# Patient Record
Sex: FEMALE | Race: WHITE | NOT HISPANIC OR LATINO | Employment: UNEMPLOYED | URBAN - METROPOLITAN AREA
[De-identification: names, ages, dates, MRNs, and addresses within clinical notes are randomized per-mention and may not be internally consistent; named-entity substitution may affect disease eponyms.]

---

## 2022-06-22 PROBLEM — F15.222 AMPHETAMINE INTOXICATION WITH PERCEPTUAL DISTURBANCE AND MODERATE TO SEVERE USE DISORDER: Status: ACTIVE | Noted: 2022-06-22

## 2022-10-24 ENCOUNTER — OFFICE VISIT (OUTPATIENT)
Dept: FAMILY MEDICINE | Facility: CLINIC | Age: 37
End: 2022-10-24

## 2022-10-24 DIAGNOSIS — F41.8 MIXED ANXIETY AND DEPRESSIVE DISORDER: Primary | ICD-10-CM

## 2022-10-24 DIAGNOSIS — Z76.0 MEDICATION REFILL: ICD-10-CM

## 2022-10-24 PROCEDURE — 99213 OFFICE O/P EST LOW 20 MIN: CPT | Mod: 95,,, | Performed by: REGISTERED NURSE

## 2022-10-24 PROCEDURE — 99213 PR OFFICE/OUTPT VISIT, EST, LEVL III, 20-29 MIN: ICD-10-PCS | Mod: 95,,, | Performed by: REGISTERED NURSE

## 2022-10-24 RX ORDER — VORTIOXETINE 10 MG/1
10 TABLET, FILM COATED ORAL DAILY
Qty: 30 TABLET | Refills: 0 | Status: SHIPPED | OUTPATIENT
Start: 2022-10-24 | End: 2022-12-09 | Stop reason: SDUPTHER

## 2022-10-24 NOTE — PROGRESS NOTES
"PRIMARY CARE TELEMEDICINE NOTE    Patient location:  [place]  Visit type: Virtual visit with synchronous audio and video  Each patient to whom he or she provides medical services by telemedicine is:  (1) informed of the relationship between the physician and patient and the respective role of any other health care provider with respect to management of the patient  (2) notified that he or she may decline to receive medical services by telemedicine and may withdraw from such care at any time      CHIEF COMPLAINT ----   MEDICATION REFILL    Josefina is seen today virtually for a medication refill on Trintellix.  Has taken previously ~ 2015 due to external and personal stressors at that time.  Did okay for a period of time until recently when her father passed away.  She feels she needs to restart the medication for control of emotions/mood.  Report very emotional, "I can't even talk with someone w/out crying".  She has tried Wellbutrin and Zoloft but were not effective.  Lexapro was ordered previously for her in case insurance did not cover Trintellix.  Never took the Lexapro as she did not have an issue getting Trintellix filled.  She has found that Trintellix has worked the best for her "with the least amount of side effects".  Hydroxyzine was previously ordered but never took.  Does have an appt with her PCP next week.      Review of Systems   Constitutional:  Positive for activity change. Negative for appetite change, fatigue and unexpected weight change.   HENT:  Negative for hearing loss, rhinorrhea and trouble swallowing.    Eyes:  Negative for discharge and visual disturbance.   Respiratory:  Negative for chest tightness and wheezing.    Cardiovascular:  Negative for chest pain and palpitations.   Gastrointestinal:  Negative for blood in stool, constipation, diarrhea and vomiting.   Endocrine: Negative for polydipsia and polyuria.   Genitourinary:  Negative for difficulty urinating, dysuria, hematuria and menstrual " problem.   Musculoskeletal:  Negative for arthralgias, joint swelling and neck pain.   Neurological:  Positive for headaches. Negative for tremors, syncope, speech difficulty, weakness, light-headedness and numbness.   Psychiatric/Behavioral:  Positive for agitation, decreased concentration, dysphoric mood and sleep disturbance. Negative for confusion, hallucinations, self-injury and suicidal ideas. The patient is nervous/anxious. The patient is not hyperactive.         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      OBJECTIVE:  Constitutional:  In NAD, pleasant, cooperative.  Appears well-developed and well-nourished.   Respiratory:  Unlabored, in no resp distress.  Neurological: Alert and oriented to person, place, and time.   Psychiatric:  Depressed mood, tearful.  Judgment and thought content normal.       Complete PE deferred due to video visit      DIAGNOSIS/ASSESSMENT:    1. Mixed anxiety and depressive disorder  - vortioxetine (TRINTELLIX) 10 mg Tab; Take 1 tablet (10 mg total) by mouth Daily.  Dispense: 30 tablet; Refill: 0    2. Medication refill  - vortioxetine (TRINTELLIX) 10 mg Tab; Take 1 tablet (10 mg total) by mouth Daily.  Dispense: 30 tablet; Refill: 0      PLAN:    Trintellix refilled per pt request.  No red flags per .    FOLLOW-UP:    See PCP next week as planned.  Any further medication refills to be handled by PCP (located near [place] where she  resides).          20 minutes of total time spent on the encounter, which includes face to face time and non-face to face time preparing to see the patient (eg, review of tests), obtaining and/or reviewing separately obtained history, and documenting clinical information in the electronic or other health record.  Also includes independent interpretation of results (not separately reported) and communicating results to the patient/family/caregiver, with care coordination (not separately reported).

## 2022-10-25 PROBLEM — F41.8 MIXED ANXIETY AND DEPRESSIVE DISORDER: Status: ACTIVE | Noted: 2022-10-25

## 2022-12-08 ENCOUNTER — PATIENT MESSAGE (OUTPATIENT)
Dept: FAMILY MEDICINE | Facility: CLINIC | Age: 37
End: 2022-12-08

## 2022-12-09 DIAGNOSIS — Z76.0 MEDICATION REFILL: ICD-10-CM

## 2022-12-09 DIAGNOSIS — F41.8 MIXED ANXIETY AND DEPRESSIVE DISORDER: ICD-10-CM

## 2022-12-09 RX ORDER — VORTIOXETINE 10 MG/1
10 TABLET, FILM COATED ORAL DAILY
Qty: 30 TABLET | Refills: 0 | Status: SHIPPED | OUTPATIENT
Start: 2022-12-09 | End: 2023-03-24 | Stop reason: SDUPTHER

## 2023-01-11 ENCOUNTER — PATIENT MESSAGE (OUTPATIENT)
Dept: FAMILY MEDICINE | Facility: CLINIC | Age: 38
End: 2023-01-11

## 2023-03-24 PROBLEM — Z76.0 MEDICATION REFILL: Status: ACTIVE | Noted: 2023-03-24

## 2023-03-24 PROBLEM — N83.202 BILATERAL OVARIAN CYSTS: Status: ACTIVE | Noted: 2023-03-24

## 2023-03-24 PROBLEM — N83.201 BILATERAL OVARIAN CYSTS: Status: ACTIVE | Noted: 2023-03-24

## 2023-03-24 PROBLEM — N80.03 ADENOMYOSIS: Status: ACTIVE | Noted: 2023-03-24

## 2023-03-24 PROBLEM — R10.2 PELVIC PAIN: Status: ACTIVE | Noted: 2023-03-24

## 2023-03-24 PROBLEM — N94.6 SEVERE DYSMENORRHEA: Status: ACTIVE | Noted: 2023-03-24

## 2023-04-26 PROBLEM — Z48.02 VISIT FOR SUTURE REMOVAL: Status: ACTIVE | Noted: 2023-04-26

## 2023-05-22 PROBLEM — Z48.02 VISIT FOR SUTURE REMOVAL: Status: RESOLVED | Noted: 2023-04-26 | Resolved: 2023-05-22

## 2023-05-22 PROBLEM — Z76.0 MEDICATION REFILL: Status: RESOLVED | Noted: 2023-03-24 | Resolved: 2023-05-22

## 2023-05-24 PROBLEM — Z90.710 STATUS POST HYSTERECTOMY: Status: ACTIVE | Noted: 2023-05-24

## 2023-06-23 PROBLEM — Z09 POSTOPERATIVE EXAMINATION: Status: ACTIVE | Noted: 2023-04-26

## 2023-07-17 PROBLEM — A64 STD (FEMALE): Status: ACTIVE | Noted: 2023-07-17

## 2023-09-25 PROBLEM — Z09 POSTOPERATIVE EXAMINATION: Status: RESOLVED | Noted: 2023-04-26 | Resolved: 2023-09-25

## 2023-12-03 ENCOUNTER — ON-DEMAND VIRTUAL (OUTPATIENT)
Dept: PRIMARY CARE CLINIC | Facility: CLINIC | Age: 38
End: 2023-12-03
Payer: MEDICAID

## 2023-12-03 DIAGNOSIS — N30.00 ACUTE CYSTITIS WITHOUT HEMATURIA: Primary | ICD-10-CM

## 2023-12-03 PROCEDURE — 99214 PR OFFICE/OUTPT VISIT, EST, LEVL IV, 30-39 MIN: ICD-10-PCS | Mod: S$PBB,95,, | Performed by: STUDENT IN AN ORGANIZED HEALTH CARE EDUCATION/TRAINING PROGRAM

## 2023-12-03 PROCEDURE — 99214 OFFICE O/P EST MOD 30 MIN: CPT | Mod: S$PBB,95,, | Performed by: STUDENT IN AN ORGANIZED HEALTH CARE EDUCATION/TRAINING PROGRAM

## 2023-12-03 RX ORDER — NITROFURANTOIN 25; 75 MG/1; MG/1
100 CAPSULE ORAL 2 TIMES DAILY
Qty: 10 CAPSULE | Refills: 0 | Status: SHIPPED | OUTPATIENT
Start: 2023-12-03 | End: 2023-12-08

## 2023-12-03 NOTE — PROGRESS NOTES
Telehealth Visit    The patient location is: [place]   The chief complaint leading to consultation is:  UTI     Visit type: audiovisual      Face to Face time with patient: 4 minutes  5 minutes of total time spent on the encounter, which includes face to face time and non-face to face time preparing to see the patient (eg, review of tests), Obtaining and/or reviewing separately obtained history, Documenting clinical information in the electronic or other health record, Independently interpreting results (not separately reported) and communicating results to the patient/family/caregiver, or Care coordination (not separately reported).       HPI    Patient is a 38 y.o.   Josefina Tong  has a past medical history of ADHD (attention deficit hyperactivity disorder) and PONV (postoperative nausea and vomiting).    Patient presenting with bladder spasms and frequent urination since Thursday.  Patient did not express any concern for STI.  She denies any fever, chills, or flank pain.            Active Medications:  Current Outpatient Medications   Medication Instructions    conjugated estrogens (PREMARIN) 0.5 g, Vaginal, Three times weekly    ibuprofen (ADVIL,MOTRIN) 600 mg, Oral, Every 6 hours    lidocaine/me-sal/menthol/camph (CBD-KINGS WITH LIDOCAINE TOP) INHALE 1 PUFF EVERY FOUR HOURS AS NEEDED    nitrofurantoin, macrocrystal-monohydrate, (MACROBID) 100 MG capsule 100 mg, Oral, 2 times daily    oxyCODONE-acetaminophen (PERCOCET)  mg per tablet 1 tablet, Oral, Every 4 hours PRN    TRINTELLIX 10 mg, Oral, Daily       Physical Exam    General: Does not appear to be in acute distress    Assessment and Plan     1. Acute cystitis without hematuria  -     nitrofurantoin, macrocrystal-monohydrate, (MACROBID) 100 MG capsule; Take 1 capsule (100 mg total) by mouth 2 (two) times daily. for 5 days  Dispense: 10 capsule; Refill: 0                 Upcoming Scheduled Appointments and Follow Up:    No future  appointments.    Follow Up [facility] (with who? when?): No follow-ups on file.        Extended Emergency Contact Information  Primary Emergency Contact: ASHLEY WILSON   [place]  Mobile Phone: [phone]  Relation: Grandparent  Preferred language: English   needed? No  Secondary Emergency Contact: Yahaira Lassiter   [facility]  Mobile Phone: [phone]  Relation: Mother      Cherelle Kaur MD   Internal Medicine  12/3/2023 - 1:35 PM        Each patient to whom he or she provides medical services by telemedicine is:  (1) informed of the relationship between the physician and patient and the respective role of any other health care provider with respect to management of the patient; and (2) notified that he or she may decline to receive medical services by telemedicine and may withdraw from such care at any time.    While patients have the right to access their medical record, it is essential to recognize that progress notes primarily serve as a means of communication among healthcare professionals.

## 2024-03-19 PROBLEM — R31.0 GROSS HEMATURIA: Status: ACTIVE | Noted: 2024-03-19

## 2024-04-23 PROBLEM — N93.9 VAGINAL BLEEDING: Status: ACTIVE | Noted: 2024-04-23

## 2024-04-23 PROBLEM — R10.2 PELVIC PAIN SYNDROME: Status: ACTIVE | Noted: 2024-04-23

## 2025-06-27 ENCOUNTER — ON-DEMAND VIRTUAL (OUTPATIENT)
Dept: URGENT CARE | Facility: CLINIC | Age: 40
End: 2025-06-27
Payer: MEDICAID

## 2025-06-27 DIAGNOSIS — R30.0 DYSURIA: Primary | ICD-10-CM

## 2025-06-27 RX ORDER — NITROFURANTOIN 25; 75 MG/1; MG/1
100 CAPSULE ORAL EVERY 12 HOURS
Qty: 14 CAPSULE | Refills: 0 | Status: SHIPPED | OUTPATIENT
Start: 2025-06-27 | End: 2025-07-04

## 2025-06-27 NOTE — PROGRESS NOTES
Subjective:      Patient ID: Josefina Tong is a 40 y.o. female.    Vitals:  vitals were not taken for this visit.     Chief Complaint: Urinary Tract Infection      Visit Type: TELE AUDIOVISUAL    Patient Location: Home     Present with the patient at the time of consultation: TELEMED PRESENT WITH PATIENT: None    Past Medical History:   Diagnosis Date    ADHD (attention deficit hyperactivity disorder)     Endometriosis, unspecified     PONV (postoperative nausea and vomiting)      Past Surgical History:   Procedure Laterality Date     SECTION      LAPAROSCOPY N/A 2023    Procedure: LAPAROSCOPY, OPERATIVE;  Surgeon: Evelyn Verma MD;  Location: [facility] MAIN OR;  Service: OB/GYN;  Laterality: N/A;  WITH POSSIBLE FULGURATION OF ENDO    ROBOT-ASSISTED LAPAROSCOPIC HYSTERECTOMY N/A 2023    Procedure: ROBOTIC HYSTERECTOMY;  Surgeon: Evelyn Verma MD;  Location: [facility] MAIN OR;  Service: OB/GYN;  Laterality: N/A;    ROBOT-ASSISTED LAPAROSCOPIC SALPINGO-OOPHORECTOMY Bilateral 2023    Procedure: ROBOTIC SALPINGO-OOPHORECTOMY;  Surgeon: Evelyn Verma MD;  Location: [facility] MAIN OR;  Service: OB/GYN;  Laterality: Bilateral;    SURGICAL REMOVAL OF ENDOMETRIOSIS Bilateral 2023    Procedure: DESTRUCTION, ENDOMETRIOSIS;  Surgeon: Evelyn Verma MD;  Location: [facility] MAIN OR;  Service: OB/GYN;  Laterality: Bilateral;    TONSILLECTOMY       Review of patient's allergies indicates:  No Known Allergies  Medications Ordered Prior to Encounter[1]  Family History   Problem Relation Name Age of Onset    Cancer Maternal Grandfather      Stroke Father      Hypertension Father      Cancer Father      Hypertension Mother      Hypertension Brother         Medications Ordered                [id number] [address]    [address], [place] [address]    Telephone: [phone]   Fax: [phone]   Hours: Not  open 24 hours                         E-Prescribed (1 of 1)              nitrofurantoin, macrocrystal-monohydrate, (MACROBID) 100 MG capsule    Sig: Take 1 capsule (100 mg total) by mouth every 12 (twelve) hours. for 7 days       Start: 6/27/25     Quantity: 14 capsule Refills: 0                           Ohs Peq Odvv Intake    6/27/2025  9:51 AM CDT - Filed by Patient   What is your current physical address in the event of a medical emergency? [address] [place] [address]   Are you able to take your vital signs? No   Please attach any relevant images or files    Is your employer contracted with [facility]? No         UTI symptoms, onset a few days. Reports frequency, urgency and dysuria. +low back pain. No hematuria. No f/c/n/v. No vaginal discharge. No severe back, abdominal, flank or pelvic pain.       Urinary Tract Infection   Associated symptoms include frequency and urgency. Pertinent negatives include no chills, flank pain, hematuria, nausea or vomiting.       Constitution: Negative for chills and fever.   Gastrointestinal:  Negative for abdominal pain, nausea and vomiting.   Genitourinary:  Positive for dysuria, frequency and urgency. Negative for flank pain, hematuria, vaginal discharge, vaginal odor and pelvic pain.   Musculoskeletal:  Positive for back pain.        Objective:   The physical exam was conducted virtually.  Physical Exam   Constitutional: She is oriented to person, place, and time. She does not appear ill. No distress.   HENT:   Head: Normocephalic and atraumatic.   Nose: Nose normal.   Eyes: Extraocular movement intact   Pulmonary/Chest: Effort normal.   Abdominal: Normal appearance.   Musculoskeletal: Normal range of motion.         General: Normal range of motion.   Neurological: no focal deficit. She is alert and oriented to person, place, and time.   Psychiatric: Her behavior is normal. Mood normal.   Vitals reviewed.      Assessment:     1. Dysuria        Plan:      Patient encouraged to monitor symptoms closely and instructed to follow-up for new or worsening symptoms. Further, in-person, evaluation may be necessary for continued treatment. Please follow up with your primary care doctor or specialist as needed. Verbally discussed plan. Patient confirms understanding and is in agreement with treatment and plan.     You must understand that you've received a [facility] evaluation only and that you may be released before all your medical problems are known or treated. You, the patient, will arrange for follow up care as instructed.    Dysuria  -     nitrofurantoin, macrocrystal-monohydrate, (MACROBID) 100 MG capsule; Take 1 capsule (100 mg total) by mouth every 12 (twelve) hours. for 7 days  Dispense: 14 capsule; Refill: 0        Patient Instructions   Patient Education       Urinary Tract Infection Discharge Instructions, Adult   About this topic   A urinary tract infection is a UTI. It is caused by germs getting into the urinary tract. The urinary tract is made up of the kidneys, ureters, bladder, and urethra. The urethra is a tube at the bottom of the bladder. Urine flows out of this tube. The germs enter the urethra and then spread in the bladder. The ureters are small tubes that join the bladder and the kidneys. Most UTIs are infections in either your bladder or your kidneys. Bladder infections are more common and may also be called cystitis. Kidney infections are more serious and may also be called pyelonephritis.         What care is needed at home?   Ask your doctor what you need to do when you go home. Make sure you ask questions if you do not understand what the doctor says. This way you will know what you need to do.  Take your drugs as ordered by your doctor.  Unless your doctor has told you otherwise, drink at least 8 to 10 glasses of water or water-based drinks each day. Do not include drinks with caffeine, like coffee or tea.  Do not hold back your urine. Go  to the bathroom every 2 to 3 hours.  What follow-up care is needed?   Your doctor may ask you to make visits to the office to check on your progress. Be sure to keep these visits.  What drugs may be needed?   The doctor may order drugs to:  Fight an infection  Help with pain  Numb your bladder  Help you pass your urine more easily  Be sure to talk to your doctor about all of your drugs if you are pregnant.  Will physical activity be limited?   Physical activities will not be limited. You may have to pass urine more often.  What changes to diet are needed?   Do not drink beer, wine, and mixed drinks (alcohol) or caffeine. These can bother the bladder.  Talk to your doctor about drinking cranberry juice.  What can be done to prevent this health problem?   Gently cleanse your genital area each day. Wipe from front to back to keep germs from going in your body.  If your penis is uncircumcised, retract the foreskin and gently clean around the head of the penis each day.  Consider other methods of birth control instead of a spermicide.  Empty your bladder after having sex.  Empty your bladder before going to sleep.  When do I need to call the doctor?   Signs of infection. These include a fever of 100.4°F (38°C) or higher, chills, back pain, nausea, throwing up, or bloody urine.  Signs come back after treatment ends  You notice more blood in your urine.  Your signs get worse or do not improve within 24 hours of starting treatment.  You are not able to urinate for more than 8 hours.  Your signs come back after treatment has stopped.  Teach Back: Helping You Understand   The Teach Back Method helps you understand the information we are giving you. After you talk with the staff, tell them in your own words what you learned. This helps to make sure the staff has described each thing clearly. It also helps to explain things that may have been confusing. Before going home, make sure you can do these things:  I can tell you about  my condition.  I can tell you how to prevent this problem from coming back.  I can tell you what I will do if my signs do not get better after 24 hours of treatment or come back after I have finished treatment.  Where can I learn more?   American Academy of Family Physicians  https://familydoctor.org/condition/urinary-tract-infections/   NHS Choices  https://www.nhs.uk/conditions/urinary-tract-infections-utis/   Last Reviewed Date   2021-06-03  Consumer Information Use and Disclaimer   This information is not specific medical advice and does not replace information you receive from your health care provider. This is only a brief summary of general information. It does NOT include all information about conditions, illnesses, injuries, tests, procedures, treatments, therapies, discharge instructions or life-style choices that may apply to you. You must talk with your health care provider for complete information about your health and treatment options. This information should not be used to decide whether or not to accept your health care providers advice, instructions or recommendations. Only your health care provider has the knowledge and training to provide advice that is right for you.  Copyright   Copyright © 2021 [employer] and its affiliates and/or licensors. All rights reserved.                   [1]   Current Outpatient Medications on File Prior to Visit   Medication Sig Dispense Refill    conjugated estrogens (PREMARIN) vaginal cream Place vaginally once daily. (Patient not taking: Reported on 5/23/2025)      dextroamphetamine-amphetamine (ADDERALL XR) 10 MG 24 hr capsule Take by mouth every morning.      [DISCONTINUED] relugolix-estradiol-norethindr (MYFEMBREE) 40-1-0.5 mg Tab Take 1 tablet by mouth Daily. (Patient not taking: Reported on 5/23/2025) 30 tablet 11    [DISCONTINUED] traMADoL (ULTRAM) 50 mg tablet Take 1 tablet (50 mg total) by mouth every 6 (six) hours as needed for Pain. (Patient not  taking: Reported on 5/23/2025) 20 tablet 0     No current facility-administered medications on file prior to visit.

## 2025-06-27 NOTE — PATIENT INSTRUCTIONS
Patient Education       Urinary Tract Infection Discharge Instructions, Adult   About this topic   A urinary tract infection is a UTI. It is caused by germs getting into the urinary tract. The urinary tract is made up of the kidneys, ureters, bladder, and urethra. The urethra is a tube at the bottom of the bladder. Urine flows out of this tube. The germs enter the urethra and then spread in the bladder. The ureters are small tubes that join the bladder and the kidneys. Most UTIs are infections in either your bladder or your kidneys. Bladder infections are more common and may also be called cystitis. Kidney infections are more serious and may also be called pyelonephritis.         What care is needed at home?   Ask your doctor what you need to do when you go home. Make sure you ask questions if you do not understand what the doctor says. This way you will know what you need to do.  Take your drugs as ordered by your doctor.  Unless your doctor has told you otherwise, drink at least 8 to 10 glasses of water or water-based drinks each day. Do not include drinks with caffeine, like coffee or tea.  Do not hold back your urine. Go to the bathroom every 2 to 3 hours.  What follow-up care is needed?   Your doctor may ask you to make visits to the office to check on your progress. Be sure to keep these visits.  What drugs may be needed?   The doctor may order drugs to:  Fight an infection  Help with pain  Numb your bladder  Help you pass your urine more easily  Be sure to talk to your doctor about all of your drugs if you are pregnant.  Will physical activity be limited?   Physical activities will not be limited. You may have to pass urine more often.  What changes to diet are needed?   Do not drink beer, wine, and mixed drinks (alcohol) or caffeine. These can bother the bladder.  Talk to your doctor about drinking cranberry juice.  What can be done to prevent this health problem?   Gently cleanse your genital area each day.  Wipe from front to back to keep germs from going in your body.  If your penis is uncircumcised, retract the foreskin and gently clean around the head of the penis each day.  Consider other methods of birth control instead of a spermicide.  Empty your bladder after having sex.  Empty your bladder before going to sleep.  When do I need to call the doctor?   Signs of infection. These include a fever of 100.4°F (38°C) or higher, chills, back pain, nausea, throwing up, or bloody urine.  Signs come back after treatment ends  You notice more blood in your urine.  Your signs get worse or do not improve within 24 hours of starting treatment.  You are not able to urinate for more than 8 hours.  Your signs come back after treatment has stopped.  Teach Back: Helping You Understand   The Teach Back Method helps you understand the information we are giving you. After you talk with the staff, tell them in your own words what you learned. This helps to make sure the staff has described each thing clearly. It also helps to explain things that may have been confusing. Before going home, make sure you can do these things:  I can tell you about my condition.  I can tell you how to prevent this problem from coming back.  I can tell you what I will do if my signs do not get better after 24 hours of treatment or come back after I have finished treatment.  Where can I learn more?   American Academy of Family Physicians  https://familydoctor.org/condition/urinary-tract-infections/   NHS Choices  https://www.nhs.uk/conditions/urinary-tract-infections-utis/   Last Reviewed Date   2021-06-03  Consumer Information Use and Disclaimer   This information is not specific medical advice and does not replace information you receive from your health care provider. This is only a brief summary of general information. It does NOT include all information about conditions, illnesses, injuries, tests, procedures, treatments, therapies, discharge  instructions or life-style choices that may apply to you. You must talk with your health care provider for complete information about your health and treatment options. This information should not be used to decide whether or not to accept your health care providers advice, instructions or recommendations. Only your health care provider has the knowledge and training to provide advice that is right for you.  Copyright   Copyright © 2021 [employer] and its affiliates and/or licensors. All rights reserved.